# Patient Record
Sex: FEMALE | URBAN - METROPOLITAN AREA
[De-identification: names, ages, dates, MRNs, and addresses within clinical notes are randomized per-mention and may not be internally consistent; named-entity substitution may affect disease eponyms.]

---

## 2023-01-11 ENCOUNTER — APPOINTMENT (RX ONLY)
Dept: URBAN - METROPOLITAN AREA CLINIC 141 | Facility: CLINIC | Age: 23
Setting detail: DERMATOLOGY
End: 2023-01-11

## 2023-01-11 DIAGNOSIS — Z41.9 ENCOUNTER FOR PROCEDURE FOR PURPOSES OTHER THAN REMEDYING HEALTH STATE, UNSPECIFIED: ICD-10-CM

## 2023-01-11 PROCEDURE — ? MICRONEEDLING

## 2023-02-14 ENCOUNTER — APPOINTMENT (RX ONLY)
Dept: URBAN - METROPOLITAN AREA CLINIC 160 | Facility: CLINIC | Age: 23
Setting detail: DERMATOLOGY
End: 2023-02-14

## 2023-02-14 DIAGNOSIS — Z41.9 ENCOUNTER FOR PROCEDURE FOR PURPOSES OTHER THAN REMEDYING HEALTH STATE, UNSPECIFIED: ICD-10-CM

## 2023-02-14 PROCEDURE — ? MICRONEEDLING

## 2023-02-14 ASSESSMENT — LOCATION DETAILED DESCRIPTION DERM: LOCATION DETAILED: RIGHT MEDIAL FOREHEAD

## 2023-02-14 ASSESSMENT — LOCATION ZONE DERM: LOCATION ZONE: FACE

## 2023-02-14 ASSESSMENT — LOCATION SIMPLE DESCRIPTION DERM: LOCATION SIMPLE: RIGHT FOREHEAD

## 2023-02-14 NOTE — PROCEDURE: MICRONEEDLING
Location #5: Neck
Depth In Mm: 1
Location #4: Nose/Periorbit
Post-Care Instructions: Skinpen microneedling two of three today, numbed 30 mins.  Pt reports no problems and little downtime from prev tx.  Seeking additional reduction of acne scars.  Pt stated she loved the way her skin glowed for weeks after tx.  Pt stated she got several compliments on her skin.  No sensitivity to protocol today.  Included neck.  Provided post care calming complex and gentle cleanser sample - directed to use over the next few days for cleansing and CC as needed for comfort/dryness/erythema.  Scheduled subsequent 4wks.\\n\\nAfter the procedure, take precautions against sun exposure. Do not apply sunscreen for 12 hours after the procedure. Do not apply make-up for 12 hours after the procedure. Avoid alcohol based toners for 10-14 days. After 2-3 days patients can return to their regular skin regimen.
Depth In Mm: 0.5
Location #1: Forehead
Location #3: Perioral
Location #2: Cheeks
Depth In Mm: 1.5
Consent: Written consent obtained, risks reviewed including but not limited to pain, scarring, infection and incomplete improvement.  Patient understands the procedure is cosmetic in nature and will require out of pocket payment.
Topical Anesthesia?: 23% lidocaine, 7% tetracaine
Speed (Optional): Cheeks 2.0mm-2.5mm
Detail Level: Zone
Depth In Mm: 2
Treatment Number (Optional): 0

## 2023-03-13 ENCOUNTER — APPOINTMENT (RX ONLY)
Dept: URBAN - METROPOLITAN AREA CLINIC 160 | Facility: CLINIC | Age: 23
Setting detail: DERMATOLOGY
End: 2023-03-13

## 2023-03-13 DIAGNOSIS — Z41.9 ENCOUNTER FOR PROCEDURE FOR PURPOSES OTHER THAN REMEDYING HEALTH STATE, UNSPECIFIED: ICD-10-CM

## 2023-03-13 PROCEDURE — ? MICRONEEDLING

## 2023-03-13 ASSESSMENT — LOCATION ZONE DERM: LOCATION ZONE: FACE

## 2023-03-13 ASSESSMENT — LOCATION SIMPLE DESCRIPTION DERM: LOCATION SIMPLE: LEFT FOREHEAD

## 2023-03-13 ASSESSMENT — LOCATION DETAILED DESCRIPTION DERM: LOCATION DETAILED: LEFT INFERIOR MEDIAL FOREHEAD

## 2023-03-13 NOTE — PROCEDURE: MICRONEEDLING
Post-Care Instructions: Skinpen microneedling three of three today, numbed 30 mins.  Pt reports no problems from prev tx. Pt stated she is satisfied with the results of her treatments, reports reduction in temporal atrophic acne scars. Pt stated she was compliant with her post care and saw an improvement of acne scars on cheeks.  No sensitivity to protocol today.  Included ZO Brightalive Accelerator serum.  Provided post care calming complex and hydrating cleanser sample - directed to use over the next few days for cleansing and CC as needed for comfort/dryness/erythema.  Scheduled subsequent 4wks.  Pt intends to purchase another package of three tx.  \\n\\nAfter the procedure, take precautions against sun exposure. Do not apply sunscreen for 12 hours after the procedure. Do not apply make-up for 12 hours after the procedure. Avoid alcohol based toners for 10-14 days. After 2-3 days patients can return to their regular skin regimen.
Depth In Mm: 1
Detail Level: Zone
Treatment Number (Optional): 0
Location #3: Cheeks
Depth In Mm: 0.5
Location #1: Forehead
Consent: Written consent obtained, risks reviewed including but not limited to pain, scarring, infection and incomplete improvement.  Patient understands the procedure is cosmetic in nature and will require out of pocket payment.
Topical Anesthesia?: 23% lidocaine, 7% tetracaine
Location #4: Perioral
Depth In Mm: 2.5
Location #2: Nose/Periorbit
Speed (Optional): 2.0-2.5

## 2023-06-07 ENCOUNTER — APPOINTMENT (RX ONLY)
Dept: URBAN - METROPOLITAN AREA CLINIC 141 | Facility: CLINIC | Age: 23
Setting detail: DERMATOLOGY
End: 2023-06-07

## 2023-06-07 DIAGNOSIS — Z41.9 ENCOUNTER FOR PROCEDURE FOR PURPOSES OTHER THAN REMEDYING HEALTH STATE, UNSPECIFIED: ICD-10-CM

## 2023-06-07 PROCEDURE — ? VENUS VIVA

## 2023-06-07 ASSESSMENT — LOCATION ZONE DERM: LOCATION ZONE: FACE

## 2023-06-07 ASSESSMENT — LOCATION DETAILED DESCRIPTION DERM: LOCATION DETAILED: RIGHT INFERIOR MEDIAL FOREHEAD

## 2023-06-07 ASSESSMENT — LOCATION SIMPLE DESCRIPTION DERM: LOCATION SIMPLE: RIGHT FOREHEAD

## 2023-06-07 NOTE — PROCEDURE: VENUS VIVA
Ending Temperature In C: 45
Starting Radiofrequency %: 35
Applicator: diamondpolar
Treatment Number: 1
Post-Procedure Text: Treatment one of six today.   Discussed downtime, risks and benefits.   Pt seeking reduction of acne scars.  Pt reports a reduction of PIH and scarring from three skinpen tx.  Pt has been compliant with post care.  Using .5 retinol - discontinued prior to tx.  Premed for RF Nanofractional 30 mins.  Two passes face and neck. \\n Tolerated well.  Discussed pre and post care.  Provided post care kit prev visit.  Discussed importance of sun avoidance and daily application of spf.  Observed mild erythema.  Pt rescheduled subsequent 4wks.  Pt traveling nurse.
Post-Procedures Photographs: No
Applicator: Viva
Immediate Post-Procedure Findings: mild erythema, mild edema
Consent: Written consent obtained, risks reviewed including but not limited to crusting, scabbing, blistering, scarring, darker or lighter pigmentary change, and/or incomplete removal.
Detail Level: Zone
Ending Temperature In C: 41
Post-Care Instructions: I reviewed with the patient in detail post-care instructions. Patient should stay away from the sun and wear sun protection until treated areas are fully healed.
Volts: 230-250
Topical Anesthesia?: 23% lidocaine, 7% tetracaine
Pre-Procedures Photographs: Yes
Location: Face - 230-250v/30ms\\n\\nPatient reports no problems from previous tx and a day of erythema.  Pt stated she has been complaint with her post care instructions and sun avoidance.\\n\\nTolerated well, observed mild erythema.  No concern.
Milliseconds: 30

## 2024-02-15 ENCOUNTER — APPOINTMENT (RX ONLY)
Dept: URBAN - METROPOLITAN AREA CLINIC 141 | Facility: CLINIC | Age: 24
Setting detail: DERMATOLOGY
End: 2024-02-15

## 2024-02-15 DIAGNOSIS — Z41.9 ENCOUNTER FOR PROCEDURE FOR PURPOSES OTHER THAN REMEDYING HEALTH STATE, UNSPECIFIED: ICD-10-CM

## 2024-02-15 PROCEDURE — ? VENUS VIVA

## 2024-02-15 ASSESSMENT — LOCATION SIMPLE DESCRIPTION DERM: LOCATION SIMPLE: LEFT CHEEK

## 2024-02-15 ASSESSMENT — LOCATION ZONE DERM: LOCATION ZONE: FACE

## 2024-02-15 ASSESSMENT — LOCATION DETAILED DESCRIPTION DERM: LOCATION DETAILED: LEFT INFERIOR CENTRAL MALAR CHEEK

## 2024-02-15 NOTE — PROCEDURE: VENUS VIVA
Starting Radiofrequency %: 35
Pre-Procedures Photographs: Yes
Topical Anesthesia?: 23% lidocaine, 7% tetracaine
Location: Neck 230v/30ms\\nFace 250v/30ms\\n\\nTolerated well, observed mild erythema.  No concern.
Immediate Post-Procedure Findings: mild erythema, mild edema
Treatment Number: 2
Treatment Number: 1
Consent: Written consent obtained, risks reviewed including but not limited to crusting, scabbing, blistering, scarring, darker or lighter pigmentary change, and/or incomplete removal.
Final Radiofrequency %: 45
Ending Temperature In C: 41
Post-Procedures Photographs: No
Detail Level: Zone
Applicator: Viva
Post-Care Instructions: Treatment two today, venus viva to face and neck.  Premed for RF Nanofractional 30 mins.  Two pulses on face, one on neck.  Discussed downtime, risks and benefits.  Discussed pre and post care.  Discussed importance of sun avoidance and daily application of spf.  Observed mild erythema - no concern.\\n\\nI reviewed with the patient in detail post-care instructions. Patient should stay away from the sun and wear sun protection until treated areas are fully healed.\\n\\nPt rescheduled for April.

## 2024-08-15 ENCOUNTER — APPOINTMENT (RX ONLY)
Dept: URBAN - METROPOLITAN AREA CLINIC 141 | Facility: CLINIC | Age: 24
Setting detail: DERMATOLOGY
End: 2024-08-15

## 2024-08-15 DIAGNOSIS — Z41.9 ENCOUNTER FOR PROCEDURE FOR PURPOSES OTHER THAN REMEDYING HEALTH STATE, UNSPECIFIED: ICD-10-CM

## 2024-08-15 PROCEDURE — ? VENUS VIVA

## 2024-08-15 ASSESSMENT — LOCATION SIMPLE DESCRIPTION DERM: LOCATION SIMPLE: LEFT CHEEK

## 2024-08-15 ASSESSMENT — LOCATION ZONE DERM: LOCATION ZONE: FACE

## 2024-08-15 ASSESSMENT — LOCATION DETAILED DESCRIPTION DERM: LOCATION DETAILED: LEFT CENTRAL MALAR CHEEK

## 2024-08-15 NOTE — PROCEDURE: VENUS VIVA
Ending Temperature In C: 45
Pre-Procedures Photographs: Yes
Location: Neck- 250v/30ms\\nFace - 250-265v/30ms\\n\\nTolerated well, observed mild erythema.  No concern.
Applicator: Viva
Treatment Number: 1
Patient Discomfort: no
Topical Anesthesia?: 23% lidocaine, 7% tetracaine
Treatment Number: 3
Starting Radiofrequency %: 35
Detail Level: Zone
Ending Temperature In C: 41
Length Topical Anesthesia Applied (Optional): 30 minutes
Immediate Post-Procedure Findings: mild erythema, mild edema
Consent: Written consent obtained, risks reviewed including but not limited to crusting, scabbing, blistering, scarring, darker or lighter pigmentary change, and/or incomplete removal.
Post-Care Instructions: Treatment three of six today, venus viva to face and neck.  Pt seeking reduction of scars and refined texture.  No problems from prev tx, observed a smoothing and firming of skin on face and neck.  Premed for RF Nanofractional 30 mins.  One pass.  Discussed downtime, risks and benefits.  Discussed pre and post care.  Discussed importance of sun avoidance and daily application of spf.  Observed mild erythema - no concern.\\n\\nI reviewed with the patient in detail post-care instructions. Patient should stay away from the sun and wear sun protection until treated areas are fully healed.

## 2024-10-17 ENCOUNTER — APPOINTMENT (RX ONLY)
Dept: URBAN - METROPOLITAN AREA CLINIC 141 | Facility: CLINIC | Age: 24
Setting detail: DERMATOLOGY
End: 2024-10-17

## 2024-10-17 DIAGNOSIS — Z41.9 ENCOUNTER FOR PROCEDURE FOR PURPOSES OTHER THAN REMEDYING HEALTH STATE, UNSPECIFIED: ICD-10-CM

## 2024-10-17 PROCEDURE — ? VENUS VIVA

## 2024-10-17 ASSESSMENT — LOCATION ZONE DERM: LOCATION ZONE: FACE

## 2024-10-17 ASSESSMENT — LOCATION SIMPLE DESCRIPTION DERM: LOCATION SIMPLE: LEFT CHEEK

## 2024-10-17 ASSESSMENT — LOCATION DETAILED DESCRIPTION DERM: LOCATION DETAILED: LEFT INFERIOR CENTRAL MALAR CHEEK

## 2024-10-17 NOTE — PROCEDURE: VENUS VIVA
Ending Temperature In C: 41
Treatment Number: 5
Starting Radiofrequency %: 35
Ending Temperature In C: 45
Treatment Number: 1
Applicator: Viva
Immediate Post-Procedure Findings: mild erythema, mild edema
Post-Procedures Photographs: No
Length Topical Anesthesia Applied (Optional): 30 minutes
Topical Anesthesia?: 23% lidocaine, 7% tetracaine
Consent: Written consent obtained, risks reviewed including but not limited to crusting, scabbing, blistering, scarring, darker or lighter pigmentary change, and/or incomplete removal.\\n\\nPremed for RF Nanofractional 30 mins.  Twp pulse on face, one on neck.  Discussed downtime, risks and benefits.  Discussed pre and post care.  Discussed importance of sun avoidance and daily application of spf.  Observed mild erythema - no concern.\\n\\nI reviewed with the patient in detail post-care instructions. Patient should stay away from the sun and wear sun protection until treated areas are fully healed.\\n\\nPatient will return when she has returned from [place].
Detail Level: Zone
Pre-Procedures Photographs: Yes
Location: Neck - 250v/30ms\\nFace - 265v/30ms\\nUnder eyes - 230v/20ms\\n\\nTolerated well, observed mild erythema.  No concern.